# Patient Record
Sex: MALE | Race: WHITE | NOT HISPANIC OR LATINO | URBAN - METROPOLITAN AREA
[De-identification: names, ages, dates, MRNs, and addresses within clinical notes are randomized per-mention and may not be internally consistent; named-entity substitution may affect disease eponyms.]

---

## 2020-10-09 ENCOUNTER — INPATIENT (INPATIENT)
Facility: HOSPITAL | Age: 85
LOS: 1 days | Discharge: HOME | DRG: 312 | End: 2020-10-11
Attending: INTERNAL MEDICINE | Admitting: FAMILY MEDICINE
Payer: MEDICARE

## 2020-10-09 VITALS
WEIGHT: 149.91 LBS | DIASTOLIC BLOOD PRESSURE: 74 MMHG | HEIGHT: 68 IN | OXYGEN SATURATION: 97 % | RESPIRATION RATE: 16 BRPM | TEMPERATURE: 99 F | SYSTOLIC BLOOD PRESSURE: 132 MMHG | HEART RATE: 84 BPM

## 2020-10-09 DIAGNOSIS — I10 ESSENTIAL (PRIMARY) HYPERTENSION: ICD-10-CM

## 2020-10-09 DIAGNOSIS — R55 SYNCOPE AND COLLAPSE: ICD-10-CM

## 2020-10-09 DIAGNOSIS — S01.111D LACERATION WITHOUT FOREIGN BODY OF RIGHT EYELID AND PERIOCULAR AREA, SUBSEQUENT ENCOUNTER: ICD-10-CM

## 2020-10-09 DIAGNOSIS — E78.49 OTHER HYPERLIPIDEMIA: ICD-10-CM

## 2020-10-09 DIAGNOSIS — N40.1 BENIGN PROSTATIC HYPERPLASIA WITH LOWER URINARY TRACT SYMPTOMS: ICD-10-CM

## 2020-10-09 DIAGNOSIS — E87.2 ACIDOSIS: ICD-10-CM

## 2020-10-09 LAB
ALBUMIN SERPL ELPH-MCNC: 3.9 G/DL — SIGNIFICANT CHANGE UP (ref 3.3–5)
ALP SERPL-CCNC: 52 U/L — SIGNIFICANT CHANGE UP (ref 40–120)
ALT FLD-CCNC: 23 U/L — SIGNIFICANT CHANGE UP (ref 12–78)
ANION GAP SERPL CALC-SCNC: 15 MMOL/L — SIGNIFICANT CHANGE UP (ref 5–17)
APPEARANCE UR: CLEAR — SIGNIFICANT CHANGE UP
APTT BLD: 24.8 SEC — LOW (ref 27.5–35.5)
AST SERPL-CCNC: 17 U/L — SIGNIFICANT CHANGE UP (ref 15–37)
BASOPHILS # BLD AUTO: 0.04 K/UL — SIGNIFICANT CHANGE UP (ref 0–0.2)
BASOPHILS NFR BLD AUTO: 0.6 % — SIGNIFICANT CHANGE UP (ref 0–2)
BILIRUB SERPL-MCNC: 0.3 MG/DL — SIGNIFICANT CHANGE UP (ref 0.2–1.2)
BILIRUB UR-MCNC: NEGATIVE — SIGNIFICANT CHANGE UP
BUN SERPL-MCNC: 14 MG/DL — SIGNIFICANT CHANGE UP (ref 7–23)
CALCIUM SERPL-MCNC: 8.8 MG/DL — SIGNIFICANT CHANGE UP (ref 8.5–10.1)
CHLORIDE SERPL-SCNC: 106 MMOL/L — SIGNIFICANT CHANGE UP (ref 96–108)
CO2 SERPL-SCNC: 20 MMOL/L — LOW (ref 22–31)
COLOR SPEC: YELLOW — SIGNIFICANT CHANGE UP
CREAT SERPL-MCNC: 0.82 MG/DL — SIGNIFICANT CHANGE UP (ref 0.5–1.3)
DIFF PNL FLD: NEGATIVE — SIGNIFICANT CHANGE UP
EOSINOPHIL # BLD AUTO: 0.08 K/UL — SIGNIFICANT CHANGE UP (ref 0–0.5)
EOSINOPHIL NFR BLD AUTO: 1.1 % — SIGNIFICANT CHANGE UP (ref 0–6)
ETHANOL SERPL-MCNC: 60 MG/DL — HIGH (ref 0–10)
GLUCOSE SERPL-MCNC: 122 MG/DL — HIGH (ref 70–99)
GLUCOSE UR QL: NEGATIVE MG/DL — SIGNIFICANT CHANGE UP
HCT VFR BLD CALC: 39.8 % — SIGNIFICANT CHANGE UP (ref 39–50)
HGB BLD-MCNC: 12.9 G/DL — LOW (ref 13–17)
IMM GRANULOCYTES NFR BLD AUTO: 0.3 % — SIGNIFICANT CHANGE UP (ref 0–1.5)
INR BLD: 1.07 RATIO — SIGNIFICANT CHANGE UP (ref 0.88–1.16)
KETONES UR-MCNC: ABNORMAL
LACTATE SERPL-SCNC: 0.9 MMOL/L — SIGNIFICANT CHANGE UP (ref 0.7–2)
LEUKOCYTE ESTERASE UR-ACNC: NEGATIVE — SIGNIFICANT CHANGE UP
LYMPHOCYTES # BLD AUTO: 1.9 K/UL — SIGNIFICANT CHANGE UP (ref 1–3.3)
LYMPHOCYTES # BLD AUTO: 26.6 % — SIGNIFICANT CHANGE UP (ref 13–44)
MCHC RBC-ENTMCNC: 29.8 PG — SIGNIFICANT CHANGE UP (ref 27–34)
MCHC RBC-ENTMCNC: 32.4 GM/DL — SIGNIFICANT CHANGE UP (ref 32–36)
MCV RBC AUTO: 91.9 FL — SIGNIFICANT CHANGE UP (ref 80–100)
MONOCYTES # BLD AUTO: 0.65 K/UL — SIGNIFICANT CHANGE UP (ref 0–0.9)
MONOCYTES NFR BLD AUTO: 9.1 % — SIGNIFICANT CHANGE UP (ref 2–14)
NEUTROPHILS # BLD AUTO: 4.46 K/UL — SIGNIFICANT CHANGE UP (ref 1.8–7.4)
NEUTROPHILS NFR BLD AUTO: 62.3 % — SIGNIFICANT CHANGE UP (ref 43–77)
NITRITE UR-MCNC: NEGATIVE — SIGNIFICANT CHANGE UP
PH UR: 6 — SIGNIFICANT CHANGE UP (ref 5–8)
PLATELET # BLD AUTO: 285 K/UL — SIGNIFICANT CHANGE UP (ref 150–400)
POTASSIUM SERPL-MCNC: 3.7 MMOL/L — SIGNIFICANT CHANGE UP (ref 3.5–5.3)
POTASSIUM SERPL-SCNC: 3.7 MMOL/L — SIGNIFICANT CHANGE UP (ref 3.5–5.3)
PROT SERPL-MCNC: 7.1 GM/DL — SIGNIFICANT CHANGE UP (ref 6–8.3)
PROT UR-MCNC: NEGATIVE MG/DL — SIGNIFICANT CHANGE UP
PROTHROM AB SERPL-ACNC: 12.4 SEC — SIGNIFICANT CHANGE UP (ref 10.6–13.6)
RBC # BLD: 4.33 M/UL — SIGNIFICANT CHANGE UP (ref 4.2–5.8)
RBC # FLD: 13 % — SIGNIFICANT CHANGE UP (ref 10.3–14.5)
SARS-COV-2 RNA SPEC QL NAA+PROBE: SIGNIFICANT CHANGE UP
SODIUM SERPL-SCNC: 141 MMOL/L — SIGNIFICANT CHANGE UP (ref 135–145)
SP GR SPEC: 1.01 — SIGNIFICANT CHANGE UP (ref 1.01–1.02)
TROPONIN I SERPL-MCNC: 0.01 NG/ML — SIGNIFICANT CHANGE UP (ref 0.01–0.04)
TROPONIN I SERPL-MCNC: <0.015 NG/ML — SIGNIFICANT CHANGE UP (ref 0.01–0.04)
UROBILINOGEN FLD QL: NEGATIVE MG/DL — SIGNIFICANT CHANGE UP
WBC # BLD: 7.15 K/UL — SIGNIFICANT CHANGE UP (ref 3.8–10.5)
WBC # FLD AUTO: 7.15 K/UL — SIGNIFICANT CHANGE UP (ref 3.8–10.5)

## 2020-10-09 PROCEDURE — 93010 ELECTROCARDIOGRAM REPORT: CPT

## 2020-10-09 PROCEDURE — 83605 ASSAY OF LACTIC ACID: CPT

## 2020-10-09 PROCEDURE — 86901 BLOOD TYPING SEROLOGIC RH(D): CPT

## 2020-10-09 PROCEDURE — 85025 COMPLETE CBC W/AUTO DIFF WBC: CPT

## 2020-10-09 PROCEDURE — 84100 ASSAY OF PHOSPHORUS: CPT

## 2020-10-09 PROCEDURE — 93005 ELECTROCARDIOGRAM TRACING: CPT

## 2020-10-09 PROCEDURE — 80053 COMPREHEN METABOLIC PANEL: CPT

## 2020-10-09 PROCEDURE — 73130 X-RAY EXAM OF HAND: CPT | Mod: 26,LT

## 2020-10-09 PROCEDURE — 86850 RBC ANTIBODY SCREEN: CPT

## 2020-10-09 PROCEDURE — 70450 CT HEAD/BRAIN W/O DYE: CPT | Mod: 26

## 2020-10-09 PROCEDURE — 85610 PROTHROMBIN TIME: CPT

## 2020-10-09 PROCEDURE — 73562 X-RAY EXAM OF KNEE 3: CPT | Mod: 26,RT

## 2020-10-09 PROCEDURE — U0003: CPT

## 2020-10-09 PROCEDURE — 76376 3D RENDER W/INTRP POSTPROCES: CPT | Mod: 26

## 2020-10-09 PROCEDURE — 86769 SARS-COV-2 COVID-19 ANTIBODY: CPT

## 2020-10-09 PROCEDURE — 36415 COLL VENOUS BLD VENIPUNCTURE: CPT

## 2020-10-09 PROCEDURE — 72125 CT NECK SPINE W/O DYE: CPT | Mod: 26

## 2020-10-09 PROCEDURE — 84484 ASSAY OF TROPONIN QUANT: CPT

## 2020-10-09 PROCEDURE — 85730 THROMBOPLASTIN TIME PARTIAL: CPT

## 2020-10-09 PROCEDURE — 86900 BLOOD TYPING SEROLOGIC ABO: CPT

## 2020-10-09 PROCEDURE — 72170 X-RAY EXAM OF PELVIS: CPT | Mod: 26

## 2020-10-09 PROCEDURE — 83036 HEMOGLOBIN GLYCOSYLATED A1C: CPT

## 2020-10-09 PROCEDURE — 82962 GLUCOSE BLOOD TEST: CPT

## 2020-10-09 PROCEDURE — 87086 URINE CULTURE/COLONY COUNT: CPT

## 2020-10-09 PROCEDURE — 83735 ASSAY OF MAGNESIUM: CPT

## 2020-10-09 PROCEDURE — 80061 LIPID PANEL: CPT

## 2020-10-09 PROCEDURE — 80307 DRUG TEST PRSMV CHEM ANLYZR: CPT

## 2020-10-09 PROCEDURE — 71045 X-RAY EXAM CHEST 1 VIEW: CPT | Mod: 26

## 2020-10-09 PROCEDURE — 85027 COMPLETE CBC AUTOMATED: CPT

## 2020-10-09 PROCEDURE — 84443 ASSAY THYROID STIM HORMONE: CPT

## 2020-10-09 PROCEDURE — 99223 1ST HOSP IP/OBS HIGH 75: CPT

## 2020-10-09 PROCEDURE — 81003 URINALYSIS AUTO W/O SCOPE: CPT

## 2020-10-09 PROCEDURE — 70486 CT MAXILLOFACIAL W/O DYE: CPT | Mod: 26

## 2020-10-09 PROCEDURE — 93306 TTE W/DOPPLER COMPLETE: CPT

## 2020-10-09 RX ORDER — FINASTERIDE 5 MG/1
5 TABLET, FILM COATED ORAL DAILY
Refills: 0 | Status: DISCONTINUED | OUTPATIENT
Start: 2020-10-09 | End: 2020-10-11

## 2020-10-09 RX ORDER — AMLODIPINE BESYLATE 2.5 MG/1
5 TABLET ORAL DAILY
Refills: 0 | Status: DISCONTINUED | OUTPATIENT
Start: 2020-10-09 | End: 2020-10-11

## 2020-10-09 RX ORDER — HEPARIN SODIUM 5000 [USP'U]/ML
5000 INJECTION INTRAVENOUS; SUBCUTANEOUS EVERY 12 HOURS
Refills: 0 | Status: DISCONTINUED | OUTPATIENT
Start: 2020-10-09 | End: 2020-10-11

## 2020-10-09 RX ORDER — SODIUM CHLORIDE 9 MG/ML
1000 INJECTION INTRAMUSCULAR; INTRAVENOUS; SUBCUTANEOUS
Refills: 0 | Status: DISCONTINUED | OUTPATIENT
Start: 2020-10-09 | End: 2020-10-10

## 2020-10-09 RX ORDER — CHOLECALCIFEROL (VITAMIN D3) 125 MCG
2000 CAPSULE ORAL DAILY
Refills: 0 | Status: DISCONTINUED | OUTPATIENT
Start: 2020-10-09 | End: 2020-10-11

## 2020-10-09 RX ORDER — BRIMONIDINE TARTRATE 2 MG/MG
1 SOLUTION/ DROPS OPHTHALMIC THREE TIMES A DAY
Refills: 0 | Status: DISCONTINUED | OUTPATIENT
Start: 2020-10-09 | End: 2020-10-11

## 2020-10-09 RX ORDER — ASCORBIC ACID 60 MG
500 TABLET,CHEWABLE ORAL DAILY
Refills: 0 | Status: DISCONTINUED | OUTPATIENT
Start: 2020-10-09 | End: 2020-10-11

## 2020-10-09 RX ORDER — TETANUS TOXOID, REDUCED DIPHTHERIA TOXOID AND ACELLULAR PERTUSSIS VACCINE, ADSORBED 5; 2.5; 8; 8; 2.5 [IU]/.5ML; [IU]/.5ML; UG/.5ML; UG/.5ML; UG/.5ML
0.5 SUSPENSION INTRAMUSCULAR ONCE
Refills: 0 | Status: COMPLETED | OUTPATIENT
Start: 2020-10-09 | End: 2020-10-09

## 2020-10-09 RX ORDER — ACETAMINOPHEN 500 MG
650 TABLET ORAL ONCE
Refills: 0 | Status: COMPLETED | OUTPATIENT
Start: 2020-10-09 | End: 2020-10-09

## 2020-10-09 RX ORDER — LISINOPRIL 2.5 MG/1
20 TABLET ORAL DAILY
Refills: 0 | Status: DISCONTINUED | OUTPATIENT
Start: 2020-10-09 | End: 2020-10-11

## 2020-10-09 RX ORDER — LATANOPROST 0.05 MG/ML
1 SOLUTION/ DROPS OPHTHALMIC; TOPICAL AT BEDTIME
Refills: 0 | Status: DISCONTINUED | OUTPATIENT
Start: 2020-10-09 | End: 2020-10-11

## 2020-10-09 RX ORDER — DORZOLAMIDE HYDROCHLORIDE TIMOLOL MALEATE 20; 5 MG/ML; MG/ML
1 SOLUTION/ DROPS OPHTHALMIC
Refills: 0 | Status: DISCONTINUED | OUTPATIENT
Start: 2020-10-09 | End: 2020-10-11

## 2020-10-09 RX ORDER — SODIUM CHLORIDE 9 MG/ML
1000 INJECTION INTRAMUSCULAR; INTRAVENOUS; SUBCUTANEOUS ONCE
Refills: 0 | Status: COMPLETED | OUTPATIENT
Start: 2020-10-09 | End: 2020-10-09

## 2020-10-09 RX ORDER — SIMVASTATIN 20 MG/1
10 TABLET, FILM COATED ORAL AT BEDTIME
Refills: 0 | Status: DISCONTINUED | OUTPATIENT
Start: 2020-10-09 | End: 2020-10-11

## 2020-10-09 RX ORDER — CALCIUM CARBONATE 500(1250)
1 TABLET ORAL DAILY
Refills: 0 | Status: DISCONTINUED | OUTPATIENT
Start: 2020-10-09 | End: 2020-10-11

## 2020-10-09 RX ADMIN — HEPARIN SODIUM 5000 UNIT(S): 5000 INJECTION INTRAVENOUS; SUBCUTANEOUS at 22:47

## 2020-10-09 RX ADMIN — TETANUS TOXOID, REDUCED DIPHTHERIA TOXOID AND ACELLULAR PERTUSSIS VACCINE, ADSORBED 0.5 MILLILITER(S): 5; 2.5; 8; 8; 2.5 SUSPENSION INTRAMUSCULAR at 16:39

## 2020-10-09 RX ADMIN — SODIUM CHLORIDE 1000 MILLILITER(S): 9 INJECTION INTRAMUSCULAR; INTRAVENOUS; SUBCUTANEOUS at 16:39

## 2020-10-09 RX ADMIN — BRIMONIDINE TARTRATE 1 DROP(S): 2 SOLUTION/ DROPS OPHTHALMIC at 22:57

## 2020-10-09 RX ADMIN — SODIUM CHLORIDE 125 MILLILITER(S): 9 INJECTION INTRAMUSCULAR; INTRAVENOUS; SUBCUTANEOUS at 20:11

## 2020-10-09 RX ADMIN — LATANOPROST 1 DROP(S): 0.05 SOLUTION/ DROPS OPHTHALMIC; TOPICAL at 22:52

## 2020-10-09 RX ADMIN — Medication 650 MILLIGRAM(S): at 17:47

## 2020-10-09 RX ADMIN — SIMVASTATIN 10 MILLIGRAM(S): 20 TABLET, FILM COATED ORAL at 22:50

## 2020-10-09 RX ADMIN — DORZOLAMIDE HYDROCHLORIDE TIMOLOL MALEATE 1 DROP(S): 20; 5 SOLUTION/ DROPS OPHTHALMIC at 23:03

## 2020-10-09 NOTE — ED ADULT NURSE NOTE - CHIEF COMPLAINT QUOTE
Pt was doing yardwork felt lightheaded and passed out, pt states + head strike, + laceration on left hand with deformity, pt A & O x 2 and cannot recall medications.

## 2020-10-09 NOTE — ED PROVIDER NOTE - SECONDARY DIAGNOSIS.
Eyebrow laceration, right, initial encounter Skin tear of hand without complication, left, initial encounter

## 2020-10-09 NOTE — ED PROVIDER NOTE - CLINICAL SUMMARY MEDICAL DECISION MAKING FREE TEXT BOX
Labs WNL.  EKG nonischemic, no arrhythmia.  CT imaging negative.  XRs appear normal.  Given IVF for possible dehydration.  Mild alcohol elevation, may have contributed.  Obs tele admit.

## 2020-10-09 NOTE — ED ADULT TRIAGE NOTE - CHIEF COMPLAINT QUOTE
Pt was doing yardwork felt lightheaded and passed out, pt sattes + head strike, + laceration on left hand with deformity, pt A & O x 2 and cannot recall medications. Pt was doing yardwork felt lightheaded and passed out, pt states + head strike, + laceration on left hand with deformity, pt A & O x 2 and cannot recall medications.

## 2020-10-09 NOTE — ED PROVIDER NOTE - OBJECTIVE STATEMENT
88 y/o male presents to the ED s/p fall. Pt states he fell outside while gardening and hit his head with +LOC. Pt reports he previously had a beer and scotch. Pt states he has +facial pain, +left hand pain, and +right knee pain. Also with +abrasions to face and R knee and +skin tear to L hand. Pt was +light headed prior to the fall but light-headedness now resolved in ED. No chest pain or SOB. No other complaints at this time. NKDA.

## 2020-10-09 NOTE — ED ADULT NURSE NOTE - OBJECTIVE STATEMENT
pt biba s/p syncopal fall while performing yard work. pt endorses right knee pain/ left wrist pain. unknown LOC. Witnessed by neighbor. trauma alert. pt on the monitor, will ctm

## 2020-10-09 NOTE — SBIRT NOTE ADULT - NSSBIRTBRIEFINTDET_GEN_A_CORE
Provided SBIRT services: Full screen positive. Brief Intervention Performed. Screening results were reviewed with the patient and patient was provided information about healthy guidelines and potential negative consequences associated with level of risk. Motivation and readiness to reduce or stop use was discussed and goals and activities to make changes were suggested/offered.

## 2020-10-09 NOTE — ED ADULT NURSE REASSESSMENT NOTE - NS ED NURSE REASSESS COMMENT FT1
pt left hand wound dressed. right hand skin tear cleaned. pt given pillow/warm blanket. on the monitor, will ctm

## 2020-10-09 NOTE — ED PROVIDER NOTE - SKIN, MLM
+multiple abrasions to face, +superficial abrasions to right knee, +skin tear to dorsal surface of left hand. +multiple abrasions to face, +superficial abrasions to right knee, +skin tear to dorsal surface of left hand, +less than .5 cm laceration adjacent to R eyebrow, no foreign body, no bleeding

## 2020-10-09 NOTE — H&P ADULT - ASSESSMENT
89 y/o WM PMH HTN, HLD, BPH following with [facility] transferred to observtion unit for monitoring after fall with pre-syncope and trauma to head.     #Fall, reports prodrome of dizziness. Alcohol intoxication, r/o underlying arrhythmia or valvular d/o  CT imaging, labs, xrays thus far benign. Pain resolving  EKG 1st degree AV block, otherwise normal, not on any AV kimani blocking agents apart from timoptic drops, unlikely culprit  Send lactate, 2nd troponin  Orthostatic BP  Borderline HAGMA with metabolic acidosis on labs, unknown source as of yet but likely lactic acidosis or etoh related.  24 hr CCM  TTE in AM to r/o valvular anomaly  Administer fluids, monitor  Fall precautions    #Metabolic Acidosis  Bicarb 20  Check lactate but already administered 1L NS in ED  Avoid etoh in future    #ophthalmic history, ?glaucoma  Resume home ophthalmic drops    #R brow lac, b/l abrasions hands, knees  Bacitracin/triple antibiotic to lacerations  F/U PCP for wound re-eval on discharge    #HTN  Start amlodipine 5mg qd, lisinopril 20mg (half home dose)    #BPH   FInasteride 5mg qhs  Monitor for urinary retention, if no output bladder scan and straight cath    #HLD  Simvastatin 40mg qhs    #VTE ppx   Heparin 5000 units q12

## 2020-10-09 NOTE — ED PROVIDER NOTE - CARE PLAN
Principal Discharge DX:	Syncope  Secondary Diagnosis:	Eyebrow laceration, right, initial encounter  Secondary Diagnosis:	Skin tear of hand without complication, left, initial encounter

## 2020-10-09 NOTE — H&P ADULT - HISTORY OF PRESENT ILLNESS
89 y/o WM PMH HTN, HLD, BPH following with [facility] presents to ED for fall at home outside while raking leaves. States he had a beer and a scotch, as per his usual 2PM routine, went outside and shoveled leaves out of the street, felt dizzy, and fell forward, half catching himself on his hands, knees, but also hit his forehead causing a 3cm laceration above R brow which was fixed with dermabond in ED. Reports he feels well overall and wants to leave as soon as possible, however does report generalized aching after the fall. Denies LOC but per other accounts said yes, notably is hard of hearing. No chest pain or SOB at baseline, denies currently. No other complaints at this time. Reports he has had this occur one other time 1 yr ago and was admitted to [facility], reports he had a slow pulse per their account but he states “it was a one time thing” and reports having had a “full work up” that was negative there last year.   PSH R knee "cartilage repair"  Home meds: reports simvastatin, finasteride, lisinopril, possibly amlodipine, cannot recall rest.  SH scotch and beer daily, 1/2 cigar daily "for many years"    Vital Signs Last 24 Hrs  T(C): 37.1 (09 Oct 2020 16:08), Max: 37.1 (09 Oct 2020 16:08)  T(F): 98.7 (09 Oct 2020 16:08), Max: 98.7 (09 Oct 2020 16:08)  HR: 81 (09 Oct 2020 17:45) (81 - 84)  BP: 136/72 (09 Oct 2020 17:45) (132/74 - 136/72)  BP(mean): --  RR: 17 (09 Oct 2020 17:45) (16 - 17)  SpO2: 100% (09 Oct 2020 17:45) (97% - 100%)    PHYSICAL EXAM:  GENERAL: NAD, speaks in full sentences, no signs of respiratory distress  HEAD:  R brow lac with dermabond, R and L knee lacerations, L hand c/d/i bandaged  EYES: EOMI, PERRL, conjunctiva and sclera erythematous R>L eye  NECK: Supple, No JVD  CHEST/LUNG: Clear to auscultation bilaterally; No wheeze; No crackles; No accessory muscles used  HEART: Regular rate and rhythm; grossly no murmurs;  ABDOMEN: Soft, Nontender, Nondistended; Bowel sounds present; No guarding  EXTREMITIES:  2+ Peripheral Pulses, No cyanosis or edema  psych : mood and affect appropriate   NEUROLOGY: non-focal, AAOx3  SKIN: lesions as mentioned above R brow, R and L knee lacerations, L hand c/d/i bandaged                          12.9   7.15  )-----------( 285      ( 09 Oct 2020 16:18 )             39.8   10-09    141  |  106  |  14  ----------------------------<  122<H>  3.7   |  20<L>  |  0.82    Ca    8.8      09 Oct 2020 16:18    TPro  7.1  /  Alb  3.9  /  TBili  0.3  /  DBili  x   /  AST  17  /  ALT  23  /  AlkPhos  52  10-09  CARDIAC MARKERS ( 09 Oct 2020 16:18 )  <0.015 ng/mL / x     / x     / x     / x        < from: Xray Knee 3 Views, Right (10.09.20 @ 17:41) >    FINDINGS: The bone mineralization is normal. There is no fracture or dislocation. The joint spaces are unremarkable. No osseous lesion is noted. There is no effusion. No soft tissue abnormalities are identified.  Femoral-popliteal arteries are calcified..    IMPRESSION:  No radiographic osseous pathology.    < end of copied text >  < from: Xray Hand 3 Views, Left (10.09.20 @ 17:41) >  INTERPRETATION:  Radiographs of the LEFT hand    CLINICAL INFORMATION:  Injury with  Pain.    TECHNIQUE:  Frontal, oblique and lateral views of the hand were obtained.    FINDINGS:   No prior examinations are available for review.    The osseous structures of the hand are intact, without fracture or malalignment.   Joint spaces appear intact.   No soft tissue abnormalities are seen.  No radiopaque foreign body is seen.    IMPRESSION:   No acute radiographic osseous pathology..    < end of copied text >  < from: Xray Pelvis AP only (10.09.20 @ 17:38) >  FINDINGS:   No prior similar studies are available for review.    Pelvic bones appear intact.  No fracture is recognized.  The hips are located.  The sacroiliac joints and pubic symphysis remain intact.  No pathologic calcifications are seen.  Soft tissues appear intact.    Bowel overlies and obscures portions of the sacrum and iliac bone.    IMPRESSION:   No acute radiographic osseous pathology..  If pain persist despite conservative therapy and patient is unable to walk a follow-up CT/MRI scan recommended to exclude occult fractures or soft tissue injuries not evident on plain film radiography.    < end of copied text >  < from: Xray Chest 1 View- PORTABLE-Urgent (Xray Chest 1 View- PORTABLE-Urgent .) (10.09.20 @ 17:38) >    FINDINGS:  The lungs are clear of airspace consolidations or effusions. No pneumothorax.    The heart and mediastinum are within normal limits.    Visualized osseous structures are intact.    < end of copied text >  < from: CT Maxillofacial No Cont (10.09.20 @ 16:49) >  IMPRESSION:    No evidence of skull fracture acute infarct, intracranial hemorrhage or mass effect.    There is no facial fracture.    There is no cervical spine fracture. Cervical spondylotic changes as described.    < end of copied text >    EKG NSR 86 borderline LAD, 1st degree AV conduction delay, no ST-T changes concerning for acute ischemia

## 2020-10-09 NOTE — ED PROVIDER NOTE - CHPI ED SYMPTOMS POS
LOSS OF CONSCIOUSNESS/PAIN/ABRASION/+facial pain, +left hand pain, +right knee pain ABRASION/LOSS OF CONSCIOUSNESS/PAIN/+skin tear, +lightheadedness

## 2020-10-09 NOTE — ED ADULT NURSE NOTE - NSIMPLEMENTINTERV_GEN_ALL_ED
Implemented All Fall Risk Interventions:  [place] to call system. Call bell, personal items and telephone within reach. Instruct patient to call for assistance. Room bathroom lighting operational. Non-slip footwear when patient is off stretcher. Physically safe environment: no spills, clutter or unnecessary equipment. Stretcher in lowest position, wheels locked, appropriate side rails in place. Provide visual cue, wrist band, yellow gown, etc. Monitor gait and stability. Monitor for mental status changes and reorient to person, place, and time. Review medications for side effects contributing to fall risk. Reinforce activity limits and safety measures with patient and family.

## 2020-10-10 DIAGNOSIS — R55 SYNCOPE AND COLLAPSE: ICD-10-CM

## 2020-10-10 LAB
A1C WITH ESTIMATED AVERAGE GLUCOSE RESULT: 5.3 % — SIGNIFICANT CHANGE UP (ref 4–5.6)
ALBUMIN SERPL ELPH-MCNC: 3.2 G/DL — LOW (ref 3.3–5)
ALP SERPL-CCNC: 44 U/L — SIGNIFICANT CHANGE UP (ref 40–120)
ALT FLD-CCNC: 19 U/L — SIGNIFICANT CHANGE UP (ref 12–78)
ANION GAP SERPL CALC-SCNC: 7 MMOL/L — SIGNIFICANT CHANGE UP (ref 5–17)
AST SERPL-CCNC: 13 U/L — LOW (ref 15–37)
BILIRUB SERPL-MCNC: 0.5 MG/DL — SIGNIFICANT CHANGE UP (ref 0.2–1.2)
BUN SERPL-MCNC: 10 MG/DL — SIGNIFICANT CHANGE UP (ref 7–23)
CALCIUM SERPL-MCNC: 7.8 MG/DL — LOW (ref 8.5–10.1)
CHLORIDE SERPL-SCNC: 112 MMOL/L — HIGH (ref 96–108)
CHOLEST SERPL-MCNC: 124 MG/DL — SIGNIFICANT CHANGE UP (ref 10–199)
CO2 SERPL-SCNC: 22 MMOL/L — SIGNIFICANT CHANGE UP (ref 22–31)
CREAT SERPL-MCNC: 0.58 MG/DL — SIGNIFICANT CHANGE UP (ref 0.5–1.3)
CULTURE RESULTS: SIGNIFICANT CHANGE UP
ESTIMATED AVERAGE GLUCOSE: 105 MG/DL — SIGNIFICANT CHANGE UP (ref 68–114)
GLUCOSE SERPL-MCNC: 100 MG/DL — HIGH (ref 70–99)
HCT VFR BLD CALC: 34.3 % — LOW (ref 39–50)
HDLC SERPL-MCNC: 36 MG/DL — LOW
HGB BLD-MCNC: 11.2 G/DL — LOW (ref 13–17)
LACTATE SERPL-SCNC: 0.6 MMOL/L — LOW (ref 0.7–2)
LIPID PNL WITH DIRECT LDL SERPL: 66 MG/DL — SIGNIFICANT CHANGE UP
MAGNESIUM SERPL-MCNC: 2.1 MG/DL — SIGNIFICANT CHANGE UP (ref 1.6–2.6)
MCHC RBC-ENTMCNC: 29.7 PG — SIGNIFICANT CHANGE UP (ref 27–34)
MCHC RBC-ENTMCNC: 32.7 GM/DL — SIGNIFICANT CHANGE UP (ref 32–36)
MCV RBC AUTO: 91 FL — SIGNIFICANT CHANGE UP (ref 80–100)
PHOSPHATE SERPL-MCNC: 2.4 MG/DL — LOW (ref 2.5–4.5)
PLATELET # BLD AUTO: 222 K/UL — SIGNIFICANT CHANGE UP (ref 150–400)
POTASSIUM SERPL-MCNC: 3.7 MMOL/L — SIGNIFICANT CHANGE UP (ref 3.5–5.3)
POTASSIUM SERPL-SCNC: 3.7 MMOL/L — SIGNIFICANT CHANGE UP (ref 3.5–5.3)
PROT SERPL-MCNC: 6 GM/DL — SIGNIFICANT CHANGE UP (ref 6–8.3)
RBC # BLD: 3.77 M/UL — LOW (ref 4.2–5.8)
RBC # FLD: 13.2 % — SIGNIFICANT CHANGE UP (ref 10.3–14.5)
SARS-COV-2 IGG SERPL QL IA: NEGATIVE — SIGNIFICANT CHANGE UP
SARS-COV-2 IGM SERPL IA-ACNC: <0.1 INDEX — SIGNIFICANT CHANGE UP
SODIUM SERPL-SCNC: 141 MMOL/L — SIGNIFICANT CHANGE UP (ref 135–145)
SPECIMEN SOURCE: SIGNIFICANT CHANGE UP
TOTAL CHOLESTEROL/HDL RATIO MEASUREMENT: 3.4 RATIO — SIGNIFICANT CHANGE UP (ref 3.4–9.6)
TRIGL SERPL-MCNC: 108 MG/DL — SIGNIFICANT CHANGE UP (ref 10–149)
TSH SERPL-MCNC: 0.73 UU/ML — SIGNIFICANT CHANGE UP (ref 0.34–4.82)
WBC # BLD: 7.98 K/UL — SIGNIFICANT CHANGE UP (ref 3.8–10.5)
WBC # FLD AUTO: 7.98 K/UL — SIGNIFICANT CHANGE UP (ref 3.8–10.5)

## 2020-10-10 PROCEDURE — 99233 SBSQ HOSP IP/OBS HIGH 50: CPT

## 2020-10-10 PROCEDURE — 99223 1ST HOSP IP/OBS HIGH 75: CPT

## 2020-10-10 PROCEDURE — 93306 TTE W/DOPPLER COMPLETE: CPT | Mod: 26

## 2020-10-10 RX ORDER — CALCIUM CARBONATE 500(1250)
1 TABLET ORAL
Qty: 0 | Refills: 0 | DISCHARGE

## 2020-10-10 RX ORDER — AMLODIPINE BESYLATE 2.5 MG/1
1 TABLET ORAL
Qty: 0 | Refills: 0 | DISCHARGE

## 2020-10-10 RX ORDER — ASCORBIC ACID 60 MG
1 TABLET,CHEWABLE ORAL
Qty: 0 | Refills: 0 | DISCHARGE

## 2020-10-10 RX ORDER — LANOLIN ALCOHOL/MO/W.PET/CERES
1 CREAM (GRAM) TOPICAL
Qty: 0 | Refills: 0 | DISCHARGE

## 2020-10-10 RX ORDER — LANOLIN ALCOHOL/MO/W.PET/CERES
5 CREAM (GRAM) TOPICAL ONCE
Refills: 0 | Status: COMPLETED | OUTPATIENT
Start: 2020-10-10 | End: 2020-10-10

## 2020-10-10 RX ORDER — SIMVASTATIN 20 MG/1
1 TABLET, FILM COATED ORAL
Qty: 0 | Refills: 0 | DISCHARGE

## 2020-10-10 RX ORDER — ZINC SULFATE TAB 220 MG (50 MG ZINC EQUIVALENT) 220 (50 ZN) MG
1 TAB ORAL
Qty: 0 | Refills: 0 | DISCHARGE

## 2020-10-10 RX ORDER — LATANOPROST 0.05 MG/ML
1 SOLUTION/ DROPS OPHTHALMIC; TOPICAL
Qty: 0 | Refills: 0 | DISCHARGE

## 2020-10-10 RX ORDER — BRIMONIDINE TARTRATE 2 MG/MG
1 SOLUTION/ DROPS OPHTHALMIC
Qty: 0 | Refills: 0 | DISCHARGE

## 2020-10-10 RX ORDER — DORZOLAMIDE HYDROCHLORIDE TIMOLOL MALEATE 20; 5 MG/ML; MG/ML
1 SOLUTION/ DROPS OPHTHALMIC
Qty: 0 | Refills: 0 | DISCHARGE

## 2020-10-10 RX ORDER — ACETAMINOPHEN 500 MG
650 TABLET ORAL EVERY 6 HOURS
Refills: 0 | Status: DISCONTINUED | OUTPATIENT
Start: 2020-10-10 | End: 2020-10-11

## 2020-10-10 RX ORDER — SOD,AMMONIUM,POTASSIUM LACTATE
1 CREAM (GRAM) TOPICAL
Qty: 0 | Refills: 0 | DISCHARGE

## 2020-10-10 RX ORDER — CHOLECALCIFEROL (VITAMIN D3) 125 MCG
1 CAPSULE ORAL
Qty: 0 | Refills: 0 | DISCHARGE

## 2020-10-10 RX ORDER — FINASTERIDE 5 MG/1
1 TABLET, FILM COATED ORAL
Qty: 0 | Refills: 0 | DISCHARGE

## 2020-10-10 RX ORDER — ACETAMINOPHEN 500 MG
650 TABLET ORAL ONCE
Refills: 0 | Status: COMPLETED | OUTPATIENT
Start: 2020-10-10 | End: 2020-10-10

## 2020-10-10 RX ADMIN — Medication 650 MILLIGRAM(S): at 03:00

## 2020-10-10 RX ADMIN — DORZOLAMIDE HYDROCHLORIDE TIMOLOL MALEATE 1 DROP(S): 20; 5 SOLUTION/ DROPS OPHTHALMIC at 09:57

## 2020-10-10 RX ADMIN — SIMVASTATIN 10 MILLIGRAM(S): 20 TABLET, FILM COATED ORAL at 21:20

## 2020-10-10 RX ADMIN — Medication 1 DROP(S): at 12:42

## 2020-10-10 RX ADMIN — LISINOPRIL 20 MILLIGRAM(S): 2.5 TABLET ORAL at 09:56

## 2020-10-10 RX ADMIN — HEPARIN SODIUM 5000 UNIT(S): 5000 INJECTION INTRAVENOUS; SUBCUTANEOUS at 21:21

## 2020-10-10 RX ADMIN — AMLODIPINE BESYLATE 5 MILLIGRAM(S): 2.5 TABLET ORAL at 09:56

## 2020-10-10 RX ADMIN — Medication 1 DROP(S): at 22:20

## 2020-10-10 RX ADMIN — Medication 1 DROP(S): at 17:46

## 2020-10-10 RX ADMIN — Medication 1 TABLET(S): at 09:56

## 2020-10-10 RX ADMIN — DORZOLAMIDE HYDROCHLORIDE TIMOLOL MALEATE 1 DROP(S): 20; 5 SOLUTION/ DROPS OPHTHALMIC at 21:21

## 2020-10-10 RX ADMIN — BRIMONIDINE TARTRATE 1 DROP(S): 2 SOLUTION/ DROPS OPHTHALMIC at 06:13

## 2020-10-10 RX ADMIN — BRIMONIDINE TARTRATE 1 DROP(S): 2 SOLUTION/ DROPS OPHTHALMIC at 12:44

## 2020-10-10 RX ADMIN — Medication 650 MILLIGRAM(S): at 10:33

## 2020-10-10 RX ADMIN — FINASTERIDE 5 MILLIGRAM(S): 5 TABLET, FILM COATED ORAL at 09:56

## 2020-10-10 RX ADMIN — Medication 2000 UNIT(S): at 09:57

## 2020-10-10 RX ADMIN — Medication 1 DROP(S): at 01:23

## 2020-10-10 RX ADMIN — Medication 5 MILLIGRAM(S): at 01:52

## 2020-10-10 RX ADMIN — HEPARIN SODIUM 5000 UNIT(S): 5000 INJECTION INTRAVENOUS; SUBCUTANEOUS at 09:56

## 2020-10-10 RX ADMIN — Medication 650 MILLIGRAM(S): at 01:52

## 2020-10-10 RX ADMIN — LATANOPROST 1 DROP(S): 0.05 SOLUTION/ DROPS OPHTHALMIC; TOPICAL at 21:22

## 2020-10-10 RX ADMIN — BRIMONIDINE TARTRATE 1 DROP(S): 2 SOLUTION/ DROPS OPHTHALMIC at 21:21

## 2020-10-10 RX ADMIN — Medication 650 MILLIGRAM(S): at 21:20

## 2020-10-10 RX ADMIN — Medication 500 MILLIGRAM(S): at 09:56

## 2020-10-10 RX ADMIN — Medication 1 DROP(S): at 06:13

## 2020-10-10 NOTE — PROGRESS NOTE ADULT - SUBJECTIVE AND OBJECTIVE BOX
Patient is a 89y old  Male who presents with a chief complaint of near syncope/fall (10 Oct 2020 10:52)    HPI:  88 y/o WM PMH HTN, HLD, BPH following with [facility] presents to ED for fall at home outside while raking leaves. States he had a beer and a scotch, as per his usual 2PM routine, went outside and shoveled leaves out of the street, felt dizzy, and fell forward, half catching himself on his hands, knees, but also hit his forehead causing a 3cm laceration above R brow which was fixed with dermabond in ED. Reports he feels well overall and wants to leave as soon as possible, however does report generalized aching after the fall. Denies LOC but per other accounts said yes, notably is hard of hearing. No chest pain or SOB at baseline, denies currently. No other complaints at this time. Reports he has had this occur one other time 1 yr ago and was admitted to [facility], reports he had a slow pulse per their account but he states “it was a one time thing” and reports having had a “full work up” that was negative there last year.     10/10:  Pt seen.  Feeling well today.  Walked with nursing.  Denies dizziness/ lightheadedness today.  States he may have drank a little more than usual yesterday.  Also usually drinks after activities but yesterday, did before.      ROS:   All 10 systems reviewed and found to be negative with the exception of what has been described above.    Vital Signs Last 24 Hrs  T(C): 36.9 (10 Oct 2020 08:46), Max: 37.1 (09 Oct 2020 16:08)  T(F): 98.4 (10 Oct 2020 08:46), Max: 98.8 (10 Oct 2020 01:20)  HR: 65 (10 Oct 2020 08:46) (63 - 84)  BP: 137/58 (10 Oct 2020 08:46) (132/74 - 152/70)  BP(mean): --  RR: 18 (10 Oct 2020 08:46) (16 - 18)  SpO2: 97% (10 Oct 2020 08:46) (97% - 100%)    PHYSICAL EXAM:  GENERAL: NAD, speaks in full sentences, no signs of respiratory distress  HEAD:  R brow lac with dermabond, R and L knee lacerations, L hand c/d/i bandaged  EYES: EOMI, PERRL, conjunctiva and sclera erythematous R>L eye  NECK: Supple, No JVD  CHEST/LUNG: Clear to auscultation bilaterally; No wheeze; No crackles; No accessory muscles used  HEART: leona-- HR ~50  ABDOMEN: Soft, Nontender, Nondistended; Bowel sounds present; No guarding  EXTREMITIES:  2+ Peripheral Pulses, No cyanosis or edema  psych : mood and affect appropriate   NEUROLOGY: non-focal, AAOx3  SKIN: lesions as mentioned above R brow, R and L knee lacerations, L hand c/d/i bandaged      LABS:                        11.2   7.98  )-----------( 222      ( 10 Oct 2020 07:35 )             34.3     10-10    141  |  112<H>  |  10  ----------------------------<  100<H>  3.7   |  22  |  0.58    Ca    7.8<L>      10 Oct 2020 07:35  Phos  2.4     10-10  Mg     2.1     10-10    TPro  6.0  /  Alb  3.2<L>  /  TBili  0.5  /  DBili  x   /  AST  13<L>  /  ALT  19  /  AlkPhos  44  10-10    PT/INR - ( 09 Oct 2020 16:18 )   PT: 12.4 sec;   INR: 1.07 ratio         PTT - ( 09 Oct 2020 16:18 )  PTT:24.8 sec  Urinalysis Basic - ( 09 Oct 2020 18:24 )    Color: Yellow / Appearance: Clear / S.015 / pH: x  Gluc: x / Ketone: Small  / Bili: Negative / Urobili: Negative mg/dL   Blood: x / Protein: Negative mg/dL / Nitrite: Negative   Leuk Esterase: Negative / RBC: x / WBC x   Sq Epi: x / Non Sq Epi: x / Bacteria: x      CARDIAC MARKERS ( 09 Oct 2020 20:30 )  0.015 ng/mL / x     / x     / x     / x      CARDIAC MARKERS ( 09 Oct 2020 16:18 )  <0.015 ng/mL / x     / x     / x     / x          MEDS  acetaminophen   Tablet .. 650 milliGRAM(s) Oral every 6 hours PRN  amLODIPine   Tablet 5 milliGRAM(s) Oral daily  ascorbic acid 500 milliGRAM(s) Oral daily  brimonidine 0.2% Ophthalmic Solution 1 Drop(s) Right EYE three times a day  calcium carbonate    500 mG (Tums) Chewable 1 Tablet(s) Chew daily  cholecalciferol 2000 Unit(s) Oral daily  dorzolamide 2%/timolol 0.5% Ophthalmic Solution 1 Drop(s) Both EYES two times a day  finasteride 5 milliGRAM(s) Oral daily  heparin   Injectable 5000 Unit(s) SubCutaneous every 12 hours  latanoprost 0.005% Ophthalmic Solution 1 Drop(s) Both EYES at bedtime  lisinopril 20 milliGRAM(s) Oral daily  multivitamin 1 Tablet(s) Oral daily  polyvinyl alcohol 1.4%/povidone 0.6% Ophthalmic Solution - Peds 1 Drop(s) Both EYES four times a day  simvastatin 10 milliGRAM(s) Oral at bedtime  sodium chloride 0.9%. 1000 milliLiter(s) IV Continuous <Continuous>

## 2020-10-10 NOTE — CONSULT NOTE ADULT - SUBJECTIVE AND OBJECTIVE BOX
HPI:  88 y/o WM PMH HTN, HLD, BPH following with [facility] presents to ED for fall at home outside while raking leaves. States he had a beer and a scotch, as per his usual 2PM routine, went outside and shoveled leaves out of the street, felt dizzy, and fell forward, half catching himself on his hands, knees, but also hit his forehead causing a 3cm laceration above R brow which was fixed with dermabond in ED. When asked if he passed out or lost consciousness he says no but does not compeltely recollect all the events that transpired and does not feel he tripped as well.  Denies chest pain, palpitations, SOB, orthopnea, PND, claudication or LE edema. Has had no prior issues exerting himself.  He is hard of hearing. Reports he has had this occur one other time 1 yr ago and was admitted to [facility], reports he had a slow pulse per their account but he states “it was a one time thing” and reports having had a “full work up” that was negative there last year.     PAST MEDICAL & SURGICAL HISTORY:  HTN  HL  Knee surgery  BPH    SOCIAL HISTORY:  Smokes cigars/Daily scotch and a beer/ No [employer] use.    FAMILY HISTORY:  NC to this admission.    Allergies  No Known Allergies      Home Medications:  acetaminophen-diphenhydramine 325 mg-12.5 mg oral tablet: 1 tab(s) orally once a day (at bedtime) (10 Oct 2020 01:17)  Alphagan P 0.1% ophthalmic solution: 1 drop(s) in the right eye every 8 hours (10 Oct 2020 01:17)  amLODIPine 5 mg oral tablet: 1 tab(s) orally once a day (10 Oct 2020 01:17)  ammonium lactate 12% topical cream: Apply topically to affected area 2 times a day (10 Oct 2020 01:17)  ascorbic acid 500 mg oral tablet: 1 tab(s) orally once a day (10 Oct 2020 01:17)  calcium (as carbonate) 500 mg oral tablet: 1 tab(s) orally once a day (10 Oct 2020 01:17)  cholecalciferol 2000 intl units (50 mcg) oral tablet: 1 tab(s) orally once a day (10 Oct 2020 01:17)  dorzolamide-timolol 2.23%-0.68% ophthalmic solution: 1 drop(s) in each eye 3 times a day (10 Oct 2020 01:17)  finasteride 5 mg oral tablet: 1 tab(s) orally once a day (10 Oct 2020 01:17)  latanoprost 0.005% ophthalmic solution: 1 drop(s) in each eye once a day (in the evening) (10 Oct 2020 01:17)  lisinopril 40 mg oral tablet: 1 tab(s) orally once a day (10 Oct 2020 01:17)  Melatonin 5 mg oral tablet: 1 tab(s) orally once a day (at bedtime) (10 Oct 2020 01:17)  Multiple Vitamins oral tablet: 1 tab(s) orally once a day (10 Oct 2020 01:17)  simvastatin 10 mg oral tablet: 1 tab(s) orally once a day (at bedtime) (10 Oct 2020 01:17)  Systane ophthalmic solution: 1 drop(s) in each eye 4 times a day (10 Oct 2020 01:17)  Zinc 140 mg (as elemental zinc 50 mg) oral tablet: 1 tab(s) orally once a day (10 Oct 2020 01:17)      HOSPITAL MEDICATIONS:   MEDICATIONS  (STANDING):  amLODIPine   Tablet 5 milliGRAM(s) Oral daily  ascorbic acid 500 milliGRAM(s) Oral daily  brimonidine 0.2% Ophthalmic Solution 1 Drop(s) Right EYE three times a day  calcium carbonate    500 mG (Tums) Chewable 1 Tablet(s) Chew daily  cholecalciferol 2000 Unit(s) Oral daily  dorzolamide 2%/timolol 0.5% Ophthalmic Solution 1 Drop(s) Both EYES two times a day  finasteride 5 milliGRAM(s) Oral daily  heparin   Injectable 5000 Unit(s) SubCutaneous every 12 hours  latanoprost 0.005% Ophthalmic Solution 1 Drop(s) Both EYES at bedtime  lisinopril 20 milliGRAM(s) Oral daily  multivitamin 1 Tablet(s) Oral daily  polyvinyl alcohol 1.4%/povidone 0.6% Ophthalmic Solution - Peds 1 Drop(s) Both EYES four times a day  simvastatin 10 milliGRAM(s) Oral at bedtime  sodium chloride 0.9%. 1000 milliLiter(s) (125 mL/Hr) IV Continuous <Continuous>    MEDICATIONS  (PRN):  acetaminophen   Tablet .. 650 milliGRAM(s) Oral every 6 hours PRN Mild Pain (1 - 3)      REVIEW OF SYSTEMS: 13 systems were reviewed and all negative except for comments above.    Vital Signs Last 24 Hrs  T(C): 36.9 (10 Oct 2020 08:46), Max: 37.1 (09 Oct 2020 16:08)  T(F): 98.4 (10 Oct 2020 08:46), Max: 98.8 (10 Oct 2020 01:20)  HR: 65 (10 Oct 2020 08:46) (63 - 84)  BP: 137/58 (10 Oct 2020 08:46) (132/74 - 152/70)  BP(mean): --  RR: 18 (10 Oct 2020 08:46) (16 - 18)  SpO2: 97% (10 Oct 2020 08:46) (97% - 100%)Daily Height in cm: 172.72 (09 Oct 2020 16:08)    Daily I&O's Summary      PHYSICAL EXAM:  Constitutional: NAD, awake and alert, well-developed  HEENT: PERRLA, EOMI,  No oral cyanosis. Oropharynx Clean and Dry.  Neck:  supple,  No JVD, No Thyroid enlargement. No Carotid Bruits bilaterally.  Respiratory: Breath sounds are clear bilaterally, No wheezing, rales or rhonchi  Cardiovascular: NL S1 and S2, RRR, No s3, 16 AMRIT to LLSB  Gastrointestinal: Bowel Sounds present, soft   Extremities: No peripheral edema. No clubbing or cyanosis.   Vascular: 1+ peripheral pulses in LE   Neurological: No gross focal motor deficits  Musculoskeletal: no calf tenderness.  Skin: No rashes.      LABS: All Labs Reviewed:                        11.2   7.98  )-----------( 222      ( 10 Oct 2020 07:35 )             34.3                         12.9   7.15  )-----------( 285      ( 09 Oct 2020 16:18 )             39.8     10 Oct 2020 07:35    141    |  112    |  10     ----------------------------<  100    3.7     |  22     |  0.58   09 Oct 2020 16:18    141    |  106    |  14     ----------------------------<  122    3.7     |  20     |  0.82     Ca    7.8        10 Oct 2020 07:35  Ca    8.8        09 Oct 2020 16:18  Phos  2.4       10 Oct 2020 07:35  Mg     2.1       10 Oct 2020 07:35    TPro  6.0    /  Alb  3.2    /  TBili  0.5    /  DBili  x      /  AST  13     /  ALT  19     /  AlkPhos  44     10 Oct 2020 07:35  TPro  7.1    /  Alb  3.9    /  TBili  0.3    /  DBili  x      /  AST  17     /  ALT  23     /  AlkPhos  52     09 Oct 2020 16:18    PT/INR - ( 09 Oct 2020 16:18 )   PT: 12.4 sec;   INR: 1.07 ratio         PTT - ( 09 Oct 2020 16:18 )  PTT:24.8 sec  CARDIAC MARKERS ( 09 Oct 2020 20:30 )  0.015 ng/mL / x     / x     / x     / x      CARDIAC MARKERS ( 09 Oct 2020 16:18 )  <0.015 ng/mL / x     / x     / x     / x        10-10 @ 07:35  TSH: 0.73    RADIOLOGY:  < from: Xray Chest 1 View- PORTABLE-Urgent (Xray Chest 1 View- PORTABLE-Urgent .) (10.09.20 @ 17:38) >  IMPRESSION:   No evidence of active chest disease.  < end of copied text >    < from: CT Head No Cont (10.09.20 @ 16:42) >  IMPRESSION:  No evidence of skull fracture acute infarct, intracranial hemorrhage or mass effect.  There is no facial fracture.  There is no cervical spine fracture. Cervical spondylotic changes as described.  < end of copied text >    EKG:  < from: 12 Lead ECG (10.09.20 @ 16:12) >  Normal sinus rhythm  Possible Anterior infarct , age undetermined  Abnormal ECG    < end of copied text >    ECHO:  Prelim: NL LV FX    TELE: Low HR but lowest HR number 50's.

## 2020-10-10 NOTE — PROGRESS NOTE ADULT - ASSESSMENT
89 y/o WM PMH HTN, HLD, BPH following with [facility] transferred to observtion unit for monitoring after fall with pre-syncope and trauma to head.     #Fall/ Near Syncope:    Cont tele monitoring-- no events to date.    F/u ECHO.    PT eval.    Cardio eval appreciated-- recommends EP eval and ? LINQ.    Pt did have ETOH intake prior to event but unclear if this could have contributed to event.    Imaging negative for acute fractures.    Trop negative.    Check Orthostatic BP.  S/p IVF-- d/c for now.  Encourage oral intake.      #R brow lac, b/l abrasions hands, knees:    Bacitracin/triple antibiotic to lacerations  F/U PCP for wound re-eval on discharge    #HTN:    Cont CCB/ ACE.      #BPH:    Cont FInasteride 5mg qhs    #HLD  Simvastatin 40mg qhs    #VTE ppx   Heparin 5000 units q12    DISPO:  Await EP eval as per cardio.  ? LINQ.  PT Eval.

## 2020-10-10 NOTE — CONSULT NOTE ADULT - PROBLEM SELECTOR RECOMMENDATION 9
What sounds c/w with an episode of presyncope with a high EGSYS score (3).  But complicating the situation he also had an elevated ETOH level on arrivial.  Given he has had this once previous further work up /evaluation seems needed.  Suggest EP consult (?ILR). Other possibilities include that the combination of Lisinopril, Norvasc and finesteride with ETOH on board can be the cause.  Check orthostatics once again now that etoh gone and confirm that this is not an issue,

## 2020-10-11 VITALS
TEMPERATURE: 98 F | HEART RATE: 63 BPM | SYSTOLIC BLOOD PRESSURE: 117 MMHG | RESPIRATION RATE: 18 BRPM | OXYGEN SATURATION: 95 % | DIASTOLIC BLOOD PRESSURE: 64 MMHG

## 2020-10-11 PROCEDURE — 99232 SBSQ HOSP IP/OBS MODERATE 35: CPT

## 2020-10-11 PROCEDURE — 99222 1ST HOSP IP/OBS MODERATE 55: CPT

## 2020-10-11 PROCEDURE — 99239 HOSP IP/OBS DSCHRG MGMT >30: CPT

## 2020-10-11 RX ORDER — LISINOPRIL 2.5 MG/1
1 TABLET ORAL
Qty: 0 | Refills: 0 | DISCHARGE

## 2020-10-11 RX ORDER — ASPIRIN/ACETAMINOPHEN/CAFFEINE 250-250-65
1 TABLET ORAL
Qty: 0 | Refills: 0 | DISCHARGE

## 2020-10-11 RX ORDER — LISINOPRIL 2.5 MG/1
1 TABLET ORAL
Qty: 30 | Refills: 0
Start: 2020-10-11

## 2020-10-11 RX ADMIN — Medication 1 DROP(S): at 12:02

## 2020-10-11 RX ADMIN — Medication 650 MILLIGRAM(S): at 05:56

## 2020-10-11 RX ADMIN — FINASTERIDE 5 MILLIGRAM(S): 5 TABLET, FILM COATED ORAL at 10:22

## 2020-10-11 RX ADMIN — Medication 1 TABLET(S): at 10:22

## 2020-10-11 RX ADMIN — Medication 2000 UNIT(S): at 10:22

## 2020-10-11 RX ADMIN — HEPARIN SODIUM 5000 UNIT(S): 5000 INJECTION INTRAVENOUS; SUBCUTANEOUS at 10:22

## 2020-10-11 RX ADMIN — DORZOLAMIDE HYDROCHLORIDE TIMOLOL MALEATE 1 DROP(S): 20; 5 SOLUTION/ DROPS OPHTHALMIC at 10:23

## 2020-10-11 RX ADMIN — BRIMONIDINE TARTRATE 1 DROP(S): 2 SOLUTION/ DROPS OPHTHALMIC at 05:47

## 2020-10-11 RX ADMIN — Medication 1 DROP(S): at 05:48

## 2020-10-11 RX ADMIN — LISINOPRIL 20 MILLIGRAM(S): 2.5 TABLET ORAL at 10:22

## 2020-10-11 RX ADMIN — Medication 500 MILLIGRAM(S): at 10:22

## 2020-10-11 RX ADMIN — AMLODIPINE BESYLATE 5 MILLIGRAM(S): 2.5 TABLET ORAL at 10:22

## 2020-10-11 NOTE — DISCHARGE NOTE PROVIDER - NSDCCPCAREPLAN_GEN_ALL_CORE_FT
PRINCIPAL DISCHARGE DIAGNOSIS  Diagnosis: Near syncope  Assessment and Plan of Treatment: Decrease lisinopril from 40 to 20.  New script sent.  F/u with Cardiology for outpatient LOOP recorder      SECONDARY DISCHARGE DIAGNOSES  Diagnosis: Skin tear of hand without complication, left, initial encounter  Assessment and Plan of Treatment:     Diagnosis: Eyebrow laceration, right, initial encounter  Assessment and Plan of Treatment:

## 2020-10-11 NOTE — DISCHARGE NOTE PROVIDER - CARE PROVIDER_API CALL
Camilo Clay  CARDIAC ELECTROPHYSIOLOGY  [address] [place] [address]  Phone: [phone]  Fax: [phone]  Follow Up Time: 1 week    [facility],   Phone: (   )    -  Fax: (   )    -  Follow Up Time: 1-3 days

## 2020-10-11 NOTE — DISCHARGE NOTE NURSING/CASE MANAGEMENT/SOCIAL WORK - PATIENT PORTAL LINK FT
You can access the FollowMyHealth Patient Portal offered by [facility] by registering at the following website: [email or web address]. By joining [employer]’s FollowMyHealth portal, you will also be able to view your health information using other applications (apps) compatible with our system.

## 2020-10-11 NOTE — DISCHARGE NOTE PROVIDER - NSDCMRMEDTOKEN_GEN_ALL_CORE_FT
Alphagan P 0.1% ophthalmic solution: 1 drop(s) in the right eye every 8 hours  amLODIPine 5 mg oral tablet: 1 tab(s) orally once a day  ammonium lactate 12% topical cream: Apply topically to affected area 2 times a day  ascorbic acid 500 mg oral tablet: 1 tab(s) orally once a day  calcium (as carbonate) 500 mg oral tablet: 1 tab(s) orally once a day  cholecalciferol 2000 intl units (50 mcg) oral tablet: 1 tab(s) orally once a day  dorzolamide-timolol 2.23%-0.68% ophthalmic solution: 1 drop(s) in each eye 3 times a day  finasteride 5 mg oral tablet: 1 tab(s) orally once a day  latanoprost 0.005% ophthalmic solution: 1 drop(s) in each eye once a day (in the evening)  lisinopril 20 mg oral tablet: 1 tab(s) orally once a day  Melatonin 5 mg oral tablet: 1 tab(s) orally once a day (at bedtime)  Multiple Vitamins oral tablet: 1 tab(s) orally once a day  simvastatin 10 mg oral tablet: 1 tab(s) orally once a day (at bedtime)  Systane ophthalmic solution: 1 drop(s) in each eye 4 times a day  Zinc 140 mg (as elemental zinc 50 mg) oral tablet: 1 tab(s) orally once a day

## 2020-10-11 NOTE — PROGRESS NOTE ADULT - PROBLEM SELECTOR PLAN 1
Differential is still arrhythmic versus orthostatic with etoh on board and medication. Await EP consult.  LV FX NL.

## 2020-10-11 NOTE — CONSULT NOTE ADULT - SUBJECTIVE AND OBJECTIVE BOX
Patient is a 89y old  Male who presents with a chief complaint of near syncope/fall (11 Oct 2020 09:11)      HPI:  88 y/o WM PMH HTN, HLD, BPH following with [facility] presents to ED for fall at home outside while raking leaves. States he had a beer and a scotch, as per his usual 2PM routine, went outside and shoveled leaves out of the street, felt dizzy, and fell forward, half catching himself on his hands, knees, but also hit his forehead causing a 3cm laceration above R brow which was fixed with dermabond in ED. Reports he feels well overall and wants to leave as soon as possible, however does report generalized aching after the fall. Denies LOC but per other accounts said yes, notably is hard of hearing. No chest pain or SOB at baseline, denies currently. No other complaints at this time. Reports he has had this occur one other time 1 yr ago and was admitted to [facility], reports he had a slow pulse per their account but he states “it was a one time thing” and reports having had a “full work up” that was negative there last year.   PSH R knee "cartilage repair"  Home meds: reports simvastatin, finasteride, lisinopril, possibly amlodipine, cannot recall rest.  SH scotch and beer daily, 1/2 cigar daily "for many years"        PHYSICAL EXAM:  GENERAL: NAD, speaks in full sentences, no signs of respiratory distress  HEAD:  R brow lac with dermabond, R and L knee lacerations, L hand c/d/i bandaged  EYES: EOMI, PERRL, conjunctiva and sclera erythematous R>L eye  NECK: Supple, No JVD  CHEST/LUNG: Clear to auscultation bilaterally; No wheeze; No crackles; No accessory muscles used  HEART: Regular rate and rhythm; grossly no murmurs;  ABDOMEN: Soft, Nontender, Nondistended; Bowel sounds present; No guarding  EXTREMITIES:  2+ Peripheral Pulses, No cyanosis or edema  psych : mood and affect appropriate   NEUROLOGY: non-focal, AAOx3  SKIN: lesions as mentioned above R brow, R and L knee lacerations, L hand c/d/i bandaged               PAST MEDICAL & SURGICAL HISTORY:  No pertinent past medical history    No significant past surgical history        PREVIOUS DIAGNOSTIC TESTING:      ECHO  FINDINGS:    STRESS  FINDINGS:    CATHETERIZATION  FINDINGS:    MEDICATIONS  (STANDING):  amLODIPine   Tablet 5 milliGRAM(s) Oral daily  ascorbic acid 500 milliGRAM(s) Oral daily  brimonidine 0.2% Ophthalmic Solution 1 Drop(s) Right EYE three times a day  calcium carbonate    500 mG (Tums) Chewable 1 Tablet(s) Chew daily  cholecalciferol 2000 Unit(s) Oral daily  dorzolamide 2%/timolol 0.5% Ophthalmic Solution 1 Drop(s) Both EYES two times a day  finasteride 5 milliGRAM(s) Oral daily  heparin   Injectable 5000 Unit(s) SubCutaneous every 12 hours  latanoprost 0.005% Ophthalmic Solution 1 Drop(s) Both EYES at bedtime  lisinopril 20 milliGRAM(s) Oral daily  multivitamin 1 Tablet(s) Oral daily  polyvinyl alcohol 1.4%/povidone 0.6% Ophthalmic Solution - Peds 1 Drop(s) Both EYES four times a day  simvastatin 10 milliGRAM(s) Oral at bedtime    MEDICATIONS  (PRN):  acetaminophen   Tablet .. 650 milliGRAM(s) Oral every 6 hours PRN Mild Pain (1 - 3)      FAMILY HISTORY:      SOCIAL HISTORY:  ***    ROS:     A comprehensive review of systems was performed and pertinent items are noted in the history above. A detailed ROS is as follows:    Constitutional	     Negative for anorexia, appetite changes, chills, fatigue, fevers, malaise, sweats and weight gain / loss.  Eyes: 	                         Negative for icterus, irritation, redness and visual disturbance.  ENT, mouth and face:	     Negative for ear discharge, earaches, hearing loss, tinnitus,  epistaxis, nasal congestion, snoring, sleep apnea, oral sores, dental and gum infection, sore throat hoarseness or voice change.  Neck:	                         Negative for thyroid enlargement, neck pain, swollen glands and difficulty in swallowing  Respiratory:                       Negative for asthma, chronic bronchitis, cough, dyspnea on exertion, emphysema, hemoptysis, pleurisy/chest pain, pneumonia, sputum, stridor and wheezing  Cardiovascular:                  Negative for chest pain, dyspnea, fatigue, irregular heart beat, near-syncope, orthopnea, palpitations, paroxysmal nocturnal dyspnea and syncope  Gastrointestinal:	      Negative for abdominal pain, nausea, vomiting, change in bowel habits, constipation, diarrhea, dyspepsia, dysphagia, odynophagia, reflux symptoms, jaundice, hematemesis, melena and hematochezia.  Genitourinary:	      Negative for genital lesions, discharge, bleeding, sexual problems, dysuria, frequency, hematuria and urinary incontinence.  Skin / Breast: 	      Negative for breast lump, breast tenderness. Negative for skin rash, redness, pruritis, swelling dryness and fissures.  Hematologic/lymphatic:   Negative for bleeding disorder, clotting disorder, petechial rash, easy bruising and lymphadenopathy.  Musculoskeletal:	      Negative for arthralgias, back pain, bone pain, muscle weakness, myalgias, neck pain and stiff joints  Vascular:	                          No leg pain, cramps, discoloration or edema.   Neurological:	      Negative for coordination problems, dizziness, gait problems, headaches, memory problems, paresthesia, seizures, speech problems, tremors, vertigo and weakness  Behavioral/Psych: 	      Negative for mood change, depression, anxiety, suicidal attempts.  Endocrine:	                          Negative for blurry vision, increased fatigue, polydipsia, polyphagia, polyuria, poor wound healing, pruritus, skin dryness and weight loss, fertility problems and temperature intolerance.  Allergic/Immunologic:	      Negative for anaphylaxis, angioedema and urticaria.      Vital Signs Last 24 Hrs  T(C): 36.6 (11 Oct 2020 08:45), Max: 37.6 (10 Oct 2020 19:49)  T(F): 97.9 (11 Oct 2020 08:45), Max: 99.7 (10 Oct 2020 19:49)  HR: 63 (11 Oct 2020 08:45) (63 - 68)  BP: 117/64 (11 Oct 2020 08:45) (117/64 - 128/65)  BP(mean): --  RR: 18 (11 Oct 2020 08:45) (18 - 18)  SpO2: 95% (11 Oct 2020 08:45) (95% - 97%)    I&O's Summary        INTERPRETATION OF TELEMETRY:      ECG:        LABS:                          11.2   7.98  )-----------( 222      ( 10 Oct 2020 07:35 )             34.3     10-10    141  |  112<H>  |  10  ----------------------------<  100<H>  3.7   |  22  |  0.58    Ca    7.8<L>      10 Oct 2020 07:35  Phos  2.4     10-10  Mg     2.1     10-10    TPro  6.0  /  Alb  3.2<L>  /  TBili  0.5  /  DBili  x   /  AST  13<L>  /  ALT  19  /  AlkPhos  44  10-10    CARDIAC MARKERS ( 09 Oct 2020 20:30 )  0.015 ng/mL / x     / x     / x     / x      CARDIAC MARKERS ( 09 Oct 2020 16:18 )  <0.015 ng/mL / x     / x     / x     / x          Lipid Panel  124  36  66  108      PT/INR - ( 09 Oct 2020 16:18 )   PT: 12.4 sec;   INR: 1.07 ratio         PTT - ( 09 Oct 2020 16:18 )  PTT:24.8 sec  Urinalysis Basic - ( 09 Oct 2020 18:24 )    Color: Yellow / Appearance: Clear / S.015 / pH: x  Gluc: x / Ketone: Small  / Bili: Negative / Urobili: Negative mg/dL   Blood: x / Protein: Negative mg/dL / Nitrite: Negative   Leuk Esterase: Negative / RBC: x / WBC x   Sq Epi: x / Non Sq Epi: x / Bacteria: x            RADIOLOGY & ADDITIONAL STUDIES:     Patient is a 89y old  Male who presents with a chief complaint of near syncope/fall (11 Oct 2020 09:11)      HPI:  Asked to see this 89  year old man with HTN, HLD, BPH following with [facility] who presented to  after a fall outside his home while raking leaves. States he had a beer and a scotch, as per his usual 2PM routine, went outside and shoveled leaves out of the street, felt dizzy, and fell forward, half catching himself on his hands, knees, but also hit his forehead causing a 3cm laceration above R brow which was fixed with dermabond in ED. He reports he feels well overall and wants to leave as soon as possible, however does report generalized aching after the fall. Denies LOC but per other accounts said yes, notably is hard of hearing. No chest pain or SOB at baseline, denies currently. No other complaints at this time. Reports he has had this occur one other time 1 yr ago and was admitted to [facility], reports he had a slow pulse per their account but he states “it was a one time thing” and reports having had a “full work up” that was negative there last year.       PSH R knee "cartilage repair"      Home meds: reports simvastatin, finasteride, lisinopril, possibly amlodipine, cannot recall rest.    MEDICATIONS  (STANDING):  amLODIPine   Tablet 5 milliGRAM(s) Oral daily  ascorbic acid 500 milliGRAM(s) Oral daily  brimonidine 0.2% Ophthalmic Solution 1 Drop(s) Right EYE three times a day  calcium carbonate    500 mG (Tums) Chewable 1 Tablet(s) Chew daily  cholecalciferol 2000 Unit(s) Oral daily  dorzolamide 2%/timolol 0.5% Ophthalmic Solution 1 Drop(s) Both EYES two times a day  finasteride 5 milliGRAM(s) Oral daily  heparin   Injectable 5000 Unit(s) SubCutaneous every 12 hours  latanoprost 0.005% Ophthalmic Solution 1 Drop(s) Both EYES at bedtime  lisinopril 20 milliGRAM(s) Oral daily  multivitamin 1 Tablet(s) Oral daily  polyvinyl alcohol 1.4%/povidone 0.6% Ophthalmic Solution - Peds 1 Drop(s) Both EYES four times a day  simvastatin 10 milliGRAM(s) Oral at bedtime    MEDICATIONS  (PRN):  acetaminophen   Tablet .. 650 milliGRAM(s) Oral every 6 hours PRN Mild Pain (1 - 3)    SH scotch and beer daily, 1/2 cigar daily "for many years"    ROS:     A comprehensive review of systems was performed and pertinent items are noted in the history above.    Vital Signs Last 24 Hrs  T(C): 36.6 (11 Oct 2020 08:45), Max: 37.6 (10 Oct 2020 19:49)  T(F): 97.9 (11 Oct 2020 08:45), Max: 99.7 (10 Oct 2020 19:49)  HR: 63 (11 Oct 2020 08:45) (63 - 68)  BP: 117/64 (11 Oct 2020 08:45) (117/64 - 128/65)  BP(mean): --  RR: 18 (11 Oct 2020 08:45) (18 - 18)  SpO2: 95% (11 Oct 2020 08:45) (95% - 97%)    PHYSICAL EXAM:  GENERAL: NAD, speaks in full sentences, no signs of respiratory distress  HEAD:  R brow lac with dermabond, R and L knee lacerations, L hand c/d/i bandaged  EYES: EOMI, PERRL, conjunctiva and sclera erythematous R>L eye  NECK: Supple, No JVD  CHEST/LUNG: Clear to auscultation bilaterally; No wheeze; No crackles; No accessory muscles used  HEART: Regular rate and rhythm; grossly no murmurs;  ABDOMEN: Soft, Nontender, Nondistended; Bowel sounds present; No guarding  EXTREMITIES:  2+ Peripheral Pulses, No cyanosis or edema  psych : mood and affect appropriate   NEUROLOGY: non-focal, AAOx3  SKIN: lesions as mentioned above R brow, R and L knee lacerations, L hand c/d/i bandaged                       INTERPRETATION OF TELEMETRY:    Sinus rhythm 60's to 70's      ECG:    Sinus 86 PRWP     LABS:                          11.2   7.98  )-----------( 222      ( 10 Oct 2020 07:35 )             34.3     10-10    141  |  112<H>  |  10  ----------------------------<  100<H>  3.7   |  22  |  0.58    Ca    7.8<L>      10 Oct 2020 07:35  Phos  2.4     10-10  Mg     2.1     10-10    TPro  6.0  /  Alb  3.2<L>  /  TBili  0.5  /  DBili  x   /  AST  13<L>  /  ALT  19  /  AlkPhos  44  10-10    CARDIAC MARKERS ( 09 Oct 2020 20:30 )  0.015 ng/mL / x     / x     / x     / x      CARDIAC MARKERS ( 09 Oct 2020 16:18 )  <0.015 ng/mL / x     / x     / x     / x          Lipid Panel  124  36  66  108      PT/INR - ( 09 Oct 2020 16:18 )   PT: 12.4 sec;   INR: 1.07 ratio         PTT - ( 09 Oct 2020 16:18 )  PTT:24.8 sec  Urinalysis Basic - ( 09 Oct 2020 18:24 )    Color: Yellow / Appearance: Clear / S.015 / pH: x  Gluc: x / Ketone: Small  / Bili: Negative / Urobili: Negative mg/dL   Blood: x / Protein: Negative mg/dL / Nitrite: Negative   Leuk Esterase: Negative / RBC: x / WBC x   Sq Epi: x / Non Sq Epi: x / Bacteria: x    Alcohol, Blood: 60: TOXIC CONCENTRATIONS (mg/dL):  Flushing, Slowing of  Reflexes, Impaired Visual Acuity:     Depression of CNS:    > 100  Fatalities Reported:       > 400  Results reported as a "< number" are below reliably detectable limits and  considered negative  These ranges are intended as general guidelines.  Alcohol metabolism can vary widely among individuals.  This test  is approved for clinical and not for forensic purposes. mg/dL (10.09.20 @ 16:18)          RADIOLOGY & ADDITIONAL STUDIES:    < from: TTE Echo Complete w/o Contrast w/ Doppler (10.10.20 @ 09:12) >   Summary     The left ventricle is normal in size, wall motion and contractility.   Estimated left ventricular ejection fraction is 55-60 %.   A sigmoid septum is noted.   Fibrocalcific changes noted to the Aortic valve leaflets with preserved   leaflet excursion.   Trace aortic regurgitation is present.   Fibrocalcific changes noted to the mitral valve leaflets with preserved   leaflet excursion.   No mitral regurgitation is present.   The tricuspid valve leaflets are thin and pliable; valve motion is normal.   Mild (1+) tricuspid valve regurgitation is present.   Mild pulmonary hypertension.    < end of copied text >

## 2020-10-11 NOTE — CONSULT NOTE ADULT - ASSESSMENT
This is a 89 year old man who presents with dizziness and a fall. He had consumed alcohol just prior to event and he reports he has been getting dizzy after taking amlodipine recently.     Clinical history suggests orthostatic hypotension related to medications and alcohol.     Would recommend a 30 day event monitor/Loop Recorder as an outpatient.   He wishes to continue his care at the [facility].

## 2020-10-11 NOTE — PROGRESS NOTE ADULT - SUBJECTIVE AND OBJECTIVE BOX
HPI:  88 y/o WM PMH HTN, HLD, BPH following with [facility] presents to ED for fall at home outside while raking leaves. States he had a beer and a scotch, as per his usual 2PM routine, went outside and shoveled leaves out of the street, felt dizzy, and fell forward, half catching himself on his hands, knees, but also hit his forehead causing a 3cm laceration above R brow which was fixed with dermabond in ED. When asked if he passed out or lost consciousness he says no but does not compeltely recollect all the events that transpired and does not feel he tripped as well.  Denies chest pain, palpitations, SOB, orthopnea, PND, claudication or LE edema. Has had no prior issues exerting himself.  He is hard of hearing. Reports he has had this occur one other time 1 yr ago and was admitted to [facility], reports he had a slow pulse per their account but he states “it was a one time thing” and reports having had a “full work up” that was negative there last year.     10/11/20: feels better.  Tele negative.     PAST MEDICAL & SURGICAL HISTORY:  HTN  HL  Knee surgery  BPH    SOCIAL HISTORY:  Smokes cigars/Daily scotch and a beer/ No [employer] use.    FAMILY HISTORY:  NC to this admission.    Allergies  No Known Allergies      MEDICATIONS  (STANDING):  amLODIPine   Tablet 5 milliGRAM(s) Oral daily  ascorbic acid 500 milliGRAM(s) Oral daily  brimonidine 0.2% Ophthalmic Solution 1 Drop(s) Right EYE three times a day  calcium carbonate    500 mG (Tums) Chewable 1 Tablet(s) Chew daily  cholecalciferol 2000 Unit(s) Oral daily  dorzolamide 2%/timolol 0.5% Ophthalmic Solution 1 Drop(s) Both EYES two times a day  finasteride 5 milliGRAM(s) Oral daily  heparin   Injectable 5000 Unit(s) SubCutaneous every 12 hours  latanoprost 0.005% Ophthalmic Solution 1 Drop(s) Both EYES at bedtime  lisinopril 20 milliGRAM(s) Oral daily  multivitamin 1 Tablet(s) Oral daily  polyvinyl alcohol 1.4%/povidone 0.6% Ophthalmic Solution - Peds 1 Drop(s) Both EYES four times a day  simvastatin 10 milliGRAM(s) Oral at bedtime    MEDICATIONS  (PRN):  acetaminophen   Tablet .. 650 milliGRAM(s) Oral every 6 hours PRN Mild Pain (1 - 3)      Vital Signs Last 24 Hrs  T(C): 36.6 (11 Oct 2020 08:45), Max: 37.6 (10 Oct 2020 19:49)  T(F): 97.9 (11 Oct 2020 08:45), Max: 99.7 (10 Oct 2020 19:49)  HR: 63 (11 Oct 2020 08:45) (63 - 68)  BP: 117/64 (11 Oct 2020 08:45) (117/64 - 128/65)  BP(mean): --  RR: 18 (11 Oct 2020 08:45) (18 - 18)  SpO2: 95% (11 Oct 2020 08:45) (95% - 97%)    I&O's Detail      Daily     Daily     PHYSICAL EXAM:  Constitutional: NAD, awake and alert, well-developed  HEENT: PERRLA, EOMI,  No oral cyanosis. Oropharynx Clean and Dry.  Neck:  supple,  No JVD, No Thyroid enlargement. No Carotid Bruits bilaterally.  Respiratory: Breath sounds are clear bilaterally, No wheezing, rales or rhonchi  Cardiovascular: NL S1 and S2, RRR, No s3, 16 AMRIT to LLSB  Gastrointestinal: Bowel Sounds present, soft   Extremities: No peripheral edema. No clubbing or cyanosis.   Vascular: 1+ peripheral pulses in LE   Neurological: No gross focal motor deficits  Musculoskeletal: no calf tenderness.  Skin: No rashes.      LABS: All Labs Reviewed:                        11.2   7.98  )-----------( 222      ( 10 Oct 2020 07:35 )             34.3                         12.9   7.15  )-----------( 285      ( 09 Oct 2020 16:18 )             39.8     10 Oct 2020 07:35    141    |  112    |  10     ----------------------------<  100    3.7     |  22     |  0.58   09 Oct 2020 16:18    141    |  106    |  14     ----------------------------<  122    3.7     |  20     |  0.82     Ca    7.8        10 Oct 2020 07:35  Ca    8.8        09 Oct 2020 16:18  Phos  2.4       10 Oct 2020 07:35  Mg     2.1       10 Oct 2020 07:35    TPro  6.0    /  Alb  3.2    /  TBili  0.5    /  DBili  x      /  AST  13     /  ALT  19     /  AlkPhos  44     10 Oct 2020 07:35  TPro  7.1    /  Alb  3.9    /  TBili  0.3    /  DBili  x      /  AST  17     /  ALT  23     /  AlkPhos  52     09 Oct 2020 16:18    PT/INR - ( 09 Oct 2020 16:18 )   PT: 12.4 sec;   INR: 1.07 ratio         PTT - ( 09 Oct 2020 16:18 )  PTT:24.8 sec  CARDIAC MARKERS ( 09 Oct 2020 20:30 )  0.015 ng/mL / x     / x     / x     / x      CARDIAC MARKERS ( 09 Oct 2020 16:18 )  <0.015 ng/mL / x     / x     / x     / x        10-10 @ 07:35  TSH: 0.73    RADIOLOGY:  < from: Xray Chest 1 View- PORTABLE-Urgent (Xray Chest 1 View- PORTABLE-Urgent .) (10.09.20 @ 17:38) >  IMPRESSION:   No evidence of active chest disease.  < end of copied text >    < from: CT Head No Cont (10.09.20 @ 16:42) >  IMPRESSION:  No evidence of skull fracture acute infarct, intracranial hemorrhage or mass effect.  There is no facial fracture.  There is no cervical spine fracture. Cervical spondylotic changes as described.  < end of copied text >    EKG:  < from: 12 Lead ECG (10.09.20 @ 16:12) >  Normal sinus rhythm  Possible Anterior infarct , age undetermined  Abnormal ECG    < end of copied text >    ECHO:  < from: TTE Echo Complete w/o Contrast w/ Doppler (10.10.20 @ 09:12) >   Summary     The left ventricle is normal in size, wall motion and contractility.   Estimated left ventricular ejection fraction is 55-60 %.   A sigmoid septum is noted.   Fibrocalcific changes noted to the Aortic valve leaflets with preserved   leaflet excursion.   Trace aortic regurgitation is present.   Fibrocalcific changes noted to the mitral valve leaflets with preserved   leaflet excursion.   No mitral regurgitation is present.   The tricuspid valve leaflets are thin and pliable; valve motion is normal.   Mild (1+) tricuspid valve regurgitation is present.   Mild pulmonary hypertension.    < end of copied text >

## 2020-10-11 NOTE — DISCHARGE NOTE PROVIDER - PROVIDER TOKENS
PROVIDER:[TOKEN:[[id number]],FOLLOWUP:[1 week]],FREE:[LAST:[[facility]],PHONE:[(   )    -],FAX:[(   )    -],FOLLOWUP:[1-3 days]]

## 2020-10-11 NOTE — DISCHARGE NOTE PROVIDER - HOSPITAL COURSE
88 y/o WM PMH HTN, HLD, BPH following with [facility] presents to ED for fall at home outside while raking leaves. States he had a beer and 2 scotch, as per his usual 2PM routine, went outside and shoveled leaves out of the street, felt dizzy, and fell forward, half catching himself on his hands, knees, but also hit his forehead causing a 3cm laceration above R brow which was fixed with dermabond in ED.  Patient notes he usually does his activities first before having his ETOH drinks but this time had drinks first then worked.  Denies LOC but per other accounts said yes.  Reports he has had this occur one other time 1 yr ago and was admitted to [facility], reports he had a slow pulse per their account but he states “it was a one time thing” and reports having had a “full work up” that was negative there last year.     Patient admitted.  Follow on TELE.  Patient was seen by cardiology who recommended EP eval.  Dr. Clay [facility].  No other acute events.  F/u with PMD re: laceration on face and left hand.      Vital Signs Last 24 Hrs  T(C): 36.6 (11 Oct 2020 08:45), Max: 37.6 (10 Oct 2020 19:49)  T(F): 97.9 (11 Oct 2020 08:45), Max: 99.7 (10 Oct 2020 19:49)  HR: 63 (11 Oct 2020 08:45) (63 - 68)  BP: 117/64 (11 Oct 2020 08:45) (117/64 - 128/65)  BP(mean): --  RR: 18 (11 Oct 2020 08:45) (18 - 18)  SpO2: 95% (11 Oct 2020 08:45) (95% - 97%)    ROS:   All 10 systems reviewed and found to be negative with the exception of what has been described above.    PHYSICAL EXAM:  GENERAL: NAD, speaks in full sentences, no signs of respiratory distress  HEAD:  R brow lac with dermabond, R and L knee lacerations, L hand c/d/i bandaged  EYES: EOMI, PERRL, conjunctiva and sclera erythematous R>L eye  NECK: Supple, No JVD  CHEST/LUNG: Clear to auscultation bilaterally; No wheeze; No crackles; No accessory muscles used  HEART: leona-- HR ~50  ABDOMEN: Soft, Nontender, Nondistended; Bowel sounds present; No guarding  EXTREMITIES:  2+ Peripheral Pulses, No cyanosis or edema  psych : mood and affect appropriate   NEUROLOGY: non-focal, AAOx3  SKIN: lesions as mentioned above R brow, R and L knee lacerations, L hand c/d/i bandaged    10-10    141  |  112<H>  |  10  ----------------------------<  100<H>  3.7   |  22  |  0.58    Ca    7.8<L>      10 Oct 2020 07:35  Phos  2.4     10-10  Mg     2.1     10-10    TPro  6.0  /  Alb  3.2<L>  /  TBili  0.5  /  DBili  x   /  AST  13<L>  /  ALT  19  /  AlkPhos  44  10-10                            11.2   7.98  )-----------( 222      ( 10 Oct 2020 07:35 )             34.3       CARDIAC MARKERS ( 09 Oct 2020 20:30 )  0.015 ng/mL / x     / x     / x     / x      CARDIAC MARKERS ( 09 Oct 2020 16:18 )  <0.015 ng/mL / x     / x     / x     / x            LIVER FUNCTIONS - ( 10 Oct 2020 07:35 )  Alb: 3.2 g/dL / Pro: 6.0 gm/dL / ALK PHOS: 44 U/L / ALT: 19 U/L / AST: 13 U/L / GGT: x             PT/INR - ( 09 Oct 2020 16:18 )   PT: 12.4 sec;   INR: 1.07 ratio         PTT - ( 09 Oct 2020 16:18 )  PTT:24.8 sec    Urinalysis Basic - ( 09 Oct 2020 18:24 )    Color: Yellow / Appearance: Clear / S.015 / pH: x  Gluc: x / Ketone: Small  / Bili: Negative / Urobili: Negative mg/dL   Blood: x / Protein: Negative mg/dL / Nitrite: Negative   Leuk Esterase: Negative / RBC: x / WBC x   Sq Epi: x / Non Sq Epi: x / Bacteria: x    PLAN:    #Fall/ Near Syncope:    No events on tele x48 hours.  Sinus leona at times to 50's.    ECHO overall WNL.    Ambulates okay with cane in hallways.    Cardio eval appreciated-- recommends EP eval and ? LINQ.    Pt did have ETOH intake prior to event but unclear if this could have solely caused event.    Imaging negative for acute fractures.    Orthostatics negative.      #R brow lac, b/l abrasions hands, knees:    Bacitracin/triple antibiotic to lacerations  F/U PCP for wound re-eval on discharge    #HTN:    Lisinopril decreased slightly from 40 to 20.  F/u PMD.      Stable for d/c home.    [facility]

## 2020-10-16 DIAGNOSIS — F10.129 ALCOHOL ABUSE WITH INTOXICATION, UNSPECIFIED: ICD-10-CM

## 2020-10-16 DIAGNOSIS — R00.1 BRADYCARDIA, UNSPECIFIED: ICD-10-CM

## 2020-10-16 DIAGNOSIS — S81.012A LACERATION WITHOUT FOREIGN BODY, LEFT KNEE, INITIAL ENCOUNTER: ICD-10-CM

## 2020-10-16 DIAGNOSIS — E87.2 ACIDOSIS: ICD-10-CM

## 2020-10-16 DIAGNOSIS — N40.1 BENIGN PROSTATIC HYPERPLASIA WITH LOWER URINARY TRACT SYMPTOMS: ICD-10-CM

## 2020-10-16 DIAGNOSIS — I10 ESSENTIAL (PRIMARY) HYPERTENSION: ICD-10-CM

## 2020-10-16 DIAGNOSIS — F17.290 NICOTINE DEPENDENCE, OTHER TOBACCO PRODUCT, UNCOMPLICATED: ICD-10-CM

## 2020-10-16 DIAGNOSIS — E78.5 HYPERLIPIDEMIA, UNSPECIFIED: ICD-10-CM

## 2020-10-16 DIAGNOSIS — S61.412A LACERATION WITHOUT FOREIGN BODY OF LEFT HAND, INITIAL ENCOUNTER: ICD-10-CM

## 2020-10-16 DIAGNOSIS — H91.90 UNSPECIFIED HEARING LOSS, UNSPECIFIED EAR: ICD-10-CM

## 2020-10-16 DIAGNOSIS — I44.0 ATRIOVENTRICULAR BLOCK, FIRST DEGREE: ICD-10-CM

## 2020-10-16 DIAGNOSIS — S01.111A LACERATION WITHOUT FOREIGN BODY OF RIGHT EYELID AND PERIOCULAR AREA, INITIAL ENCOUNTER: ICD-10-CM

## 2020-10-16 DIAGNOSIS — S81.011A LACERATION WITHOUT FOREIGN BODY, RIGHT KNEE, INITIAL ENCOUNTER: ICD-10-CM

## 2020-10-16 DIAGNOSIS — S60.512A ABRASION OF LEFT HAND, INITIAL ENCOUNTER: ICD-10-CM
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
## 2020-10-16 DIAGNOSIS — R35.0 FREQUENCY OF MICTURITION: ICD-10-CM
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
## 2020-10-16 DIAGNOSIS — E86.0 DEHYDRATION: ICD-10-CM

## 2020-10-16 DIAGNOSIS — R55 SYNCOPE AND COLLAPSE: ICD-10-CM
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
